# Patient Record
Sex: MALE | Race: WHITE | URBAN - NONMETROPOLITAN AREA
[De-identification: names, ages, dates, MRNs, and addresses within clinical notes are randomized per-mention and may not be internally consistent; named-entity substitution may affect disease eponyms.]

---
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
## 2019-07-05 ENCOUNTER — TELEPHONE (OUTPATIENT)
Dept: FAMILY MEDICINE CLINIC | Facility: CLINIC | Age: 19
End: 2019-07-05

## 2019-11-25 ENCOUNTER — OFFICE VISIT (OUTPATIENT)
Dept: FAMILY MEDICINE CLINIC | Facility: CLINIC | Age: 19
End: 2019-11-25
Payer: COMMERCIAL

## 2019-11-25 VITALS
BODY MASS INDEX: 21.27 KG/M2 | OXYGEN SATURATION: 99 % | TEMPERATURE: 97 F | DIASTOLIC BLOOD PRESSURE: 76 MMHG | HEIGHT: 73.75 IN | HEART RATE: 90 BPM | WEIGHT: 164 LBS | SYSTOLIC BLOOD PRESSURE: 120 MMHG

## 2019-11-25 DIAGNOSIS — J32.9 SINUSITIS, UNSPECIFIED CHRONICITY, UNSPECIFIED LOCATION: ICD-10-CM

## 2019-11-25 DIAGNOSIS — J40 BRONCHITIS: ICD-10-CM

## 2019-11-25 DIAGNOSIS — G43.109 MIGRAINE WITH AURA AND WITHOUT STATUS MIGRAINOSUS, NOT INTRACTABLE: Primary | ICD-10-CM

## 2019-11-25 DIAGNOSIS — L74.510 HYPERHIDROSIS OF AXILLA: ICD-10-CM

## 2019-11-25 PROCEDURE — 99214 OFFICE O/P EST MOD 30 MIN: CPT | Performed by: FAMILY MEDICINE

## 2019-11-25 RX ORDER — AZITHROMYCIN 250 MG/1
TABLET, FILM COATED ORAL
Qty: 6 TABLET | Refills: 0 | Status: SHIPPED | OUTPATIENT
Start: 2019-11-25 | End: 2020-05-15 | Stop reason: ALTCHOICE

## 2019-11-25 RX ORDER — PREDNISONE 20 MG/1
40 TABLET ORAL DAILY
Qty: 10 TABLET | Refills: 0 | Status: SHIPPED | OUTPATIENT
Start: 2019-11-25 | End: 2019-11-30

## 2019-11-25 RX ORDER — SUMATRIPTAN 50 MG/1
50 TABLET, FILM COATED ORAL EVERY 2 HOUR PRN
Qty: 10 TABLET | Refills: 2 | Status: SHIPPED | OUTPATIENT
Start: 2019-11-25 | End: 2021-05-19

## 2019-11-25 NOTE — PROGRESS NOTES
HPI:    Patient ID: Alayna Simon is a 22year old male. X 1 month head saul / cough  W/o fever  OTC meds without relief. Not improving  Feels more in the chest per patient. Complaints also of sweaty armpits and hands. Question treatment.   Migraines Wt 164 lb (74.4 kg)   SpO2 99%   BMI 21.20 kg/m²              ASSESSMENT/PLAN:   Migraine with aura and without status migrainosus, not intractable  (primary encounter diagnosis)  Sinusitis, unspecified chronicity, unspecified location  Bronchitis  Hyperhi

## 2019-11-25 NOTE — PATIENT INSTRUCTIONS
Reassurance regarding hyperhidrosis. Offered prescription Drysol. Patient wishes to hold on.   REST,FLUIDS,ADVIL / TYLENOL PRN fever / body aches  CALL NO CHANGE WORSENING  DISCUSSED WARNING SIGNS  F/U No change 2-3 days  Humidifier, Vicks, Alavert-D.

## 2020-05-15 ENCOUNTER — TELEPHONE (OUTPATIENT)
Dept: FAMILY MEDICINE CLINIC | Facility: CLINIC | Age: 20
End: 2020-05-15

## 2020-05-15 ENCOUNTER — VIRTUAL PHONE E/M (OUTPATIENT)
Dept: FAMILY MEDICINE CLINIC | Facility: CLINIC | Age: 20
End: 2020-05-15
Payer: COMMERCIAL

## 2020-05-15 DIAGNOSIS — J02.9 EXUDATIVE PHARYNGITIS: Primary | ICD-10-CM

## 2020-05-15 PROCEDURE — 99213 OFFICE O/P EST LOW 20 MIN: CPT | Performed by: FAMILY MEDICINE

## 2020-05-15 RX ORDER — AMOXICILLIN 875 MG/1
875 TABLET, COATED ORAL 2 TIMES DAILY
Qty: 20 TABLET | Refills: 0 | Status: SHIPPED | OUTPATIENT
Start: 2020-05-15 | End: 2021-05-19 | Stop reason: ALTCHOICE

## 2020-05-15 NOTE — TELEPHONE ENCOUNTER
Pt presents with a sore throat. Started feeling it last night, woke up this morning white stuff in the back of throat. Feels like he get this once a year.     Virtual/Telephone Check-In    Bam Chavez verbally consents to a [employer] se

## 2020-05-15 NOTE — PROGRESS NOTES
Virtual/Telephone Check-In    Bam Muller  verbally consents to a Virtual/Telephone Check-In service on 5/15/2020   Patient understands and accepts financial responsibility for any deductible, co-insurance and/or co-pays associated with this service. no nausea or abdominal pain  NEURO: denies headaches      EXAM:   VITALS: not available as this is a telemed visit    GENERAL: No specific issues at this time  LUNGS: Patient has no conversational dyspnea and can talk freely without any gasping for air FAAFP

## 2021-05-19 ENCOUNTER — OFFICE VISIT (OUTPATIENT)
Dept: FAMILY MEDICINE CLINIC | Facility: CLINIC | Age: 21
End: 2021-05-19
Payer: COMMERCIAL

## 2021-05-19 VITALS
OXYGEN SATURATION: 98 % | DIASTOLIC BLOOD PRESSURE: 66 MMHG | HEART RATE: 86 BPM | HEIGHT: 73 IN | BODY MASS INDEX: 20.87 KG/M2 | WEIGHT: 157.5 LBS | SYSTOLIC BLOOD PRESSURE: 104 MMHG | TEMPERATURE: 99 F

## 2021-05-19 DIAGNOSIS — F41.1 GENERALIZED ANXIETY DISORDER: ICD-10-CM

## 2021-05-19 DIAGNOSIS — F34.1 DYSTHYMIA: ICD-10-CM

## 2021-05-19 DIAGNOSIS — J30.2 SEASONAL ALLERGIC RHINITIS, UNSPECIFIED TRIGGER: ICD-10-CM

## 2021-05-19 DIAGNOSIS — Z00.00 ROUTINE HEALTH MAINTENANCE: Primary | ICD-10-CM

## 2021-05-19 DIAGNOSIS — G43.109 MIGRAINE WITH AURA AND WITHOUT STATUS MIGRAINOSUS, NOT INTRACTABLE: ICD-10-CM

## 2021-05-19 PROCEDURE — 3008F BODY MASS INDEX DOCD: CPT | Performed by: FAMILY MEDICINE

## 2021-05-19 PROCEDURE — 3074F SYST BP LT 130 MM HG: CPT | Performed by: FAMILY MEDICINE

## 2021-05-19 PROCEDURE — 99395 PREV VISIT EST AGE 18-39: CPT | Performed by: FAMILY MEDICINE

## 2021-05-19 PROCEDURE — 3078F DIAST BP <80 MM HG: CPT | Performed by: FAMILY MEDICINE

## 2021-05-19 RX ORDER — SUMATRIPTAN 50 MG/1
50 TABLET, FILM COATED ORAL EVERY 2 HOUR PRN
Qty: 10 TABLET | Refills: 2 | Status: SHIPPED | OUTPATIENT
Start: 2021-05-19

## 2021-05-19 RX ORDER — FLUTICASONE PROPIONATE 50 MCG
2 SPRAY, SUSPENSION (ML) NASAL DAILY
Qty: 3 BOTTLE | Refills: 3 | Status: SHIPPED | OUTPATIENT
Start: 2021-05-19 | End: 2022-05-14

## 2021-05-19 NOTE — PROGRESS NOTES
Bam Chavez is a 23year old male. Patient presents with:  CPX: annual physical--and a few other things. .... [facility] room 8      HPI:   Physical.  Patient with complaints of allergy. Antihistamine with some relief. Wondering about options.   Patient's Governor Shelly distress  SKIN: no rashes,no suspicious lesions  HEENT: atraumatic, normocephalic, R TM normal, L TM normal, Pharynx normal  NECK: supple, no cervical adenopathy  LUNGS: clear to auscultation  CARDIO: RRR without murmur  ABD soft, nontender, normal BS, no

## 2021-08-09 NOTE — TELEPHONE ENCOUNTER
Pt calls stating he is supposed to give an update re: his new medication. Pt states he like and feels better since taking this medication. Pt would like a refill.     Last refill: 5/19/21 390 w/ 0 refills    Last OV: 5/19/21  Last labs: None    No future ap

## 2021-11-05 NOTE — TELEPHONE ENCOUNTER
Requested Prescriptions     Pending Prescriptions Disp Refills   • SERTRALINE 50 MG Oral Tab [Pharmacy Med Name: SERTRALINE 50MG TABLETS] 90 tablet 0     Sig: TAKE 1 TABLET(50 MG) BY MOUTH DAILY     Last refill #90 on 8/9/2021  Last office visit pertaining

## 2022-02-26 NOTE — TELEPHONE ENCOUNTER
LOV: 05/19/21    LAST LAB: n/a    LAST RX: 11/5/21    Next OV: No future appointments.     PROTOCOL: n/a

## 2022-02-28 NOTE — TELEPHONE ENCOUNTER
Left Message - due for f/u with doctor for med check      By Emely Scanlon RN        Left Message - due for f/u with doctor for med check      By Emely Scanlon RN

## 2022-04-18 ENCOUNTER — TELEPHONE (OUTPATIENT)
Dept: FAMILY MEDICINE CLINIC | Facility: CLINIC | Age: 22
End: 2022-04-18

## 2022-04-18 NOTE — TELEPHONE ENCOUNTER
PLEASE CALL PT MOTHER- PT IS VERY DEPRESSED AND NEEDS HELP.  SHE WOULD LIKE TO DISCUSS OPTIONS WITH YOU-    Tiffanie Montejo

## 2022-04-18 NOTE — TELEPHONE ENCOUNTER
Mother calls- fawad is very depressed; asks what should she do to get him help. Denies thoughts of hurting himself and others; but comments \"it's too late now. \"  Directed to take him to [facility] for intake assessment.   ej/cj

## 2022-05-14 RX ORDER — FOLIC ACID 1 MG/1
1 TABLET ORAL DAILY
Qty: 90 TABLET | Refills: 0 | Status: SHIPPED | OUTPATIENT
Start: 2022-05-14

## 2022-05-14 RX ORDER — NALTREXONE HYDROCHLORIDE 50 MG/1
50 TABLET, FILM COATED ORAL DAILY
Qty: 90 TABLET | Refills: 0 | Status: SHIPPED | OUTPATIENT
Start: 2022-05-14

## 2022-05-14 RX ORDER — THIAMINE MONONITRATE (VIT B1) 100 MG
100 TABLET ORAL DAILY
Qty: 90 TABLET | Refills: 0 | Status: SHIPPED | OUTPATIENT
Start: 2022-05-14

## 2022-05-14 NOTE — TELEPHONE ENCOUNTER
folic acid 1 MG Oral Tab, naltrexone 50 MG Oral Tab, Vitamin B1, sertraline 50 MG Oral Tab  - [facility]

## 2022-09-12 RX ORDER — THIAMINE MONONITRATE (VIT B1) 100 MG
100 TABLET ORAL DAILY
Qty: 90 TABLET | Refills: 0 | Status: SHIPPED | OUTPATIENT
Start: 2022-09-12

## 2022-09-12 RX ORDER — NALTREXONE HYDROCHLORIDE 50 MG/1
50 TABLET, FILM COATED ORAL DAILY
Qty: 90 TABLET | Refills: 0 | Status: SHIPPED | OUTPATIENT
Start: 2022-09-12

## 2022-09-12 RX ORDER — FOLIC ACID 1 MG/1
1 TABLET ORAL DAILY
Qty: 90 TABLET | Refills: 0 | Status: SHIPPED | OUTPATIENT
Start: 2022-09-12

## 2022-12-21 RX ORDER — FOLIC ACID 1 MG/1
TABLET ORAL
Qty: 90 TABLET | Refills: 0 | OUTPATIENT
Start: 2022-12-21

## 2022-12-22 NOTE — TELEPHONE ENCOUNTER
Future Appointments   Date Time Provider Javad Romo   12/27/2022 11:00 AM Joana Elizalde DO [facility]

## 2023-02-26 DIAGNOSIS — F41.1 GENERALIZED ANXIETY DISORDER: ICD-10-CM

## 2023-02-26 DIAGNOSIS — F34.1 DYSTHYMIA: ICD-10-CM

## 2023-04-06 ENCOUNTER — TELEPHONE (OUTPATIENT)
Dept: FAMILY MEDICINE CLINIC | Facility: CLINIC | Age: 23
End: 2023-04-06

## 2023-04-06 DIAGNOSIS — F41.1 GENERALIZED ANXIETY DISORDER: ICD-10-CM

## 2023-04-06 DIAGNOSIS — F34.1 DYSTHYMIA: ICD-10-CM

## 2023-04-06 DIAGNOSIS — F10.11 HISTORY OF ETOH ABUSE: ICD-10-CM

## 2023-04-06 RX ORDER — FOLIC ACID 1 MG/1
1 TABLET ORAL DAILY
Qty: 90 TABLET | Refills: 1 | Status: SHIPPED | OUTPATIENT
Start: 2023-04-06

## 2023-05-30 DIAGNOSIS — L25.9 CONTACT DERMATITIS: ICD-10-CM

## 2023-05-30 RX ORDER — CLOBETASOL PROPIONATE 0.5 MG/G
CREAM TOPICAL
Qty: 60 G | Refills: 0 | Status: SHIPPED | OUTPATIENT
Start: 2023-05-30

## 2023-05-30 NOTE — TELEPHONE ENCOUNTER
Just started breaking out acouple days ago with poison ivy on both forearms. Using calamine and will take benadryl tonight. Will doctor order medication for him.     Clobetasol ordered august 2013

## 2023-12-16 DIAGNOSIS — F10.11 HISTORY OF ETOH ABUSE: ICD-10-CM

## 2023-12-16 DIAGNOSIS — F34.1 DYSTHYMIA: ICD-10-CM

## 2023-12-16 DIAGNOSIS — F41.1 GENERALIZED ANXIETY DISORDER: ICD-10-CM

## 2023-12-16 NOTE — TELEPHONE ENCOUNTER
PT REQUESTING REFILLS ON sertraline 50 MG Oral Tab AND folic acid 1 MG Oral Tab.     [facility]

## 2023-12-18 RX ORDER — FOLIC ACID 1 MG/1
1 TABLET ORAL DAILY
Qty: 90 TABLET | Refills: 0 | Status: SHIPPED | OUTPATIENT
Start: 2023-12-18

## 2024-01-26 ENCOUNTER — TELEPHONE (OUTPATIENT)
Dept: FAMILY MEDICINE CLINIC | Facility: CLINIC | Age: 24
End: 2024-01-26

## 2024-01-30 ENCOUNTER — TELEPHONE (OUTPATIENT)
Dept: FAMILY MEDICINE CLINIC | Facility: CLINIC | Age: 24
End: 2024-01-30

## 2024-01-30 DIAGNOSIS — F34.1 DYSTHYMIA: ICD-10-CM

## 2024-01-30 DIAGNOSIS — F41.1 GENERALIZED ANXIETY DISORDER: ICD-10-CM

## 2024-01-30 NOTE — TELEPHONE ENCOUNTER
Patient is presently working in [place].  The company he works for is out of [place].  He moves frequently.  Next time will be back home is over July 4th.  Instructed doctor wants to see him q 6 mths, and has been over 1 year since seen.  Situation discussed with Dr Spangler.  Advised- doctor will keep him as a patient, but must be seen when home over July 4th.  Will only refill the Sertraline.  No folic acid until bloodwork done,    Future Appointments   Date Time Provider Department Center   7/3/2024  9:15 AM King Spangler, DO [facility] [place]   7/3/2024  9:30 AM REF [facility] REF [facility] Ref [facility] [place]

## 2024-01-30 NOTE — TELEPHONE ENCOUNTER
PT NEEDS SERTRALINE AND FOLIC ACID SENT TO [id number] - [place], [place] - [address] [facility] [address]    CHART HAS BEEN UPDATED.

## 2024-03-11 ENCOUNTER — TELEPHONE (OUTPATIENT)
Dept: FAMILY MEDICINE CLINIC | Facility: CLINIC | Age: 24
End: 2024-03-11

## 2024-03-11 DIAGNOSIS — F41.1 GENERALIZED ANXIETY DISORDER: ICD-10-CM

## 2024-03-11 DIAGNOSIS — F34.1 DYSTHYMIA: ICD-10-CM

## 2024-03-11 NOTE — TELEPHONE ENCOUNTER
Patient is presently on Sertraline 50 mg daily; and feels like it isn't helping.  He is feeling more down in the dumps; depressed.  Can dose be adjusted?  He will see you in July over the 4th

## 2024-04-17 NOTE — TELEPHONE ENCOUNTER
Dose is 0.05%  It was a 60 gm tube. Mom says it was 2/3rds empty. She is calling poison control. Mental Health Treatment/Substance Use Disorder Treatment

## 2024-04-30 ENCOUNTER — OFFICE VISIT (OUTPATIENT)
Dept: FAMILY MEDICINE CLINIC | Facility: CLINIC | Age: 24
End: 2024-04-30
Payer: COMMERCIAL

## 2024-04-30 ENCOUNTER — TELEPHONE (OUTPATIENT)
Dept: FAMILY MEDICINE CLINIC | Facility: CLINIC | Age: 24
End: 2024-04-30

## 2024-04-30 VITALS
BODY MASS INDEX: 24 KG/M2 | OXYGEN SATURATION: 96 % | WEIGHT: 188 LBS | HEART RATE: 120 BPM | TEMPERATURE: 99 F | SYSTOLIC BLOOD PRESSURE: 118 MMHG | DIASTOLIC BLOOD PRESSURE: 70 MMHG

## 2024-04-30 DIAGNOSIS — F10.11 HISTORY OF ETOH ABUSE: ICD-10-CM

## 2024-04-30 DIAGNOSIS — F41.1 GENERALIZED ANXIETY DISORDER: ICD-10-CM

## 2024-04-30 DIAGNOSIS — F34.1 DYSTHYMIA: ICD-10-CM

## 2024-04-30 DIAGNOSIS — L72.3 SEBACEOUS CYST OF AXILLA: Primary | ICD-10-CM

## 2024-04-30 PROCEDURE — 3078F DIAST BP <80 MM HG: CPT | Performed by: FAMILY MEDICINE

## 2024-04-30 PROCEDURE — 96127 BRIEF EMOTIONAL/BEHAV ASSMT: CPT | Performed by: FAMILY MEDICINE

## 2024-04-30 PROCEDURE — 99214 OFFICE O/P EST MOD 30 MIN: CPT | Performed by: FAMILY MEDICINE

## 2024-04-30 PROCEDURE — 3074F SYST BP LT 130 MM HG: CPT | Performed by: FAMILY MEDICINE

## 2024-04-30 RX ORDER — TRAZODONE HYDROCHLORIDE 50 MG/1
TABLET ORAL
COMMUNITY
Start: 2024-04-02

## 2024-04-30 RX ORDER — ESCITALOPRAM OXALATE 10 MG/1
TABLET ORAL
COMMUNITY
Start: 2024-04-07

## 2024-04-30 RX ORDER — GABAPENTIN 600 MG/1
TABLET ORAL
COMMUNITY
Start: 2024-04-18

## 2024-04-30 RX ORDER — HYDROXYZINE PAMOATE 25 MG/1
CAPSULE ORAL
COMMUNITY
Start: 2024-04-02

## 2024-04-30 RX ORDER — CLONIDINE HYDROCHLORIDE 0.1 MG/1
TABLET ORAL
COMMUNITY
Start: 2024-04-02

## 2024-04-30 RX ORDER — ARIPIPRAZOLE 20 MG/1
TABLET ORAL
COMMUNITY
Start: 2024-04-25

## 2024-04-30 RX ORDER — CEPHALEXIN 500 MG/1
500 CAPSULE ORAL 3 TIMES DAILY
Qty: 30 CAPSULE | Refills: 0 | Status: SHIPPED | OUTPATIENT
Start: 2024-04-30

## 2024-04-30 RX ORDER — ATOMOXETINE 40 MG/1
CAPSULE ORAL
COMMUNITY
Start: 2024-04-25

## 2024-04-30 NOTE — PROGRESS NOTES
Subjective:   Patient ID: Bam Stoll is a 23 year old male.  Patient status post inpatient alcohol withdrawal.  Status post seizure.  Seeing counselor and psychiatry.  Taking medications as directed.  Just discharged from inpatient treatment.  Axillary infected sebaceous cyst.  Comes and goes.  Has had opened multiple times.  No treatment at this time.  HPI    History/Other:   Review of Systems  Current Outpatient Medications   Medication Sig Dispense Refill    ARIPiprazole 20 MG Oral Tab       atomoxetine 40 MG Oral Cap       gabapentin 600 MG Oral Tab       hydrOXYzine Pamoate 25 MG Oral Cap       traZODone 50 MG Oral Tab       cephalexin 500 MG Oral Cap Take 1 capsule (500 mg total) by mouth 3 (three) times daily. 30 capsule 0    sertraline 50 MG Oral Tab Take 1.5 tablets (75 mg total) by mouth daily. 135 tablet 0    folic acid 1 MG Oral Tab Take 1 tablet (1 mg total) by mouth daily. 90 tablet 0    clobetasol 0.05 % External Cream Apply bid x 10 days 60 g 0    Vitamin B-1 100 MG Oral Tab Take 1 tablet (100 mg total) by mouth daily. 90 tablet 1    naltrexone 50 MG Oral Tab Take 1 tablet (50 mg total) by mouth daily. 90 tablet 0    SUMAtriptan Succinate (IMITREX) 50 MG Oral Tab Take 1 tablet (50 mg total) by mouth every 2 (two) hours as needed for Migraine. Use at onset; repeat once after 2 HRS-ONLY 2 IN 24 HR MAX (Patient not taking: Reported on 5/5/2022) 10 tablet 2     Allergies:No Known Allergies    Objective:   Physical Exam  Vitals reviewed.   Constitutional:       Appearance: Normal appearance.   Cardiovascular:      Rate and Rhythm: Normal rate and regular rhythm.      Pulses: Normal pulses.      Heart sounds: Normal heart sounds.   Pulmonary:      Effort: Pulmonary effort is normal.      Breath sounds: Normal breath sounds.   Musculoskeletal:      Cervical back: Neck supple.   Skin:     General: Skin is warm and dry.      Comments: Erythema, induration infected sebaceous cyst right axilla.  Q-tip with  hydrogen peroxide.  Capsule removed.  Dressing applied.  Patient to use Q-tip with peroxide daily to keep open.     Neurological:      Mental Status: He is alert.       /70   Pulse 120   Temp 99 °F (37.2 °C) (Temporal)   Wt 188 lb (85.3 kg)   SpO2 96%   BMI 24.30 kg/m²     Assessment & Plan:   1. History of ETOH abuse    2. Sebaceous cyst of axilla,infected R    3. Generalized anxiety disorder    4. Dysthymia        No orders of the defined types were placed in this encounter.      Meds This Visit:  Requested Prescriptions     Signed Prescriptions Disp Refills    cephalexin 500 MG Oral Cap 30 capsule 0     Sig: Take 1 capsule (500 mg total) by mouth 3 (three) times daily.       Imaging & Referrals:  None

## 2024-04-30 NOTE — PATIENT INSTRUCTIONS
Continue follow-up with psychiatry and counseling.  Medications as directed by them.  Moist heat to right axilla.  Q-tip with peroxide daily.  Call with questions or problems.  Follow-up in 2 to 4 weeks.  Call with questions or problems.

## 2024-04-30 NOTE — TELEPHONE ENCOUNTER
Just seen- asking for above scripts; clonidine not even listed, no dose on trazodone.  Routed to Dr Spangler; are you filling these?

## 2024-04-30 NOTE — TELEPHONE ENCOUNTER
Clonidine, trazodone, aripiprazole, folic acid     his insurance will only pay for a 90 day supply of each    [facility] [place]

## 2024-07-03 ENCOUNTER — LABORATORY ENCOUNTER (OUTPATIENT)
Dept: LAB | Age: 24
End: 2024-07-03
Attending: FAMILY MEDICINE
Payer: COMMERCIAL

## 2024-07-03 DIAGNOSIS — F34.1 DYSTHYMIA: ICD-10-CM

## 2024-07-03 DIAGNOSIS — F41.1 GENERALIZED ANXIETY DISORDER: ICD-10-CM

## 2024-07-03 DIAGNOSIS — F10.11 HISTORY OF ETOH ABUSE: ICD-10-CM

## 2024-07-03 LAB
ALBUMIN SERPL-MCNC: 4.1 G/DL (ref 3.4–5)
ALBUMIN/GLOB SERPL: 1.4 {RATIO} (ref 1–2)
ALP LIVER SERPL-CCNC: 54 U/L
ALT SERPL-CCNC: 20 U/L
ANION GAP SERPL CALC-SCNC: 6 MMOL/L (ref 0–18)
AST SERPL-CCNC: 11 U/L (ref 15–37)
BASOPHILS # BLD AUTO: 0.08 X10(3) UL (ref 0–0.2)
BASOPHILS NFR BLD AUTO: 1.4 %
BILIRUB SERPL-MCNC: 0.4 MG/DL (ref 0.1–2)
BUN BLD-MCNC: 7 MG/DL (ref 9–23)
CALCIUM BLD-MCNC: 9 MG/DL (ref 8.5–10.1)
CHLORIDE SERPL-SCNC: 107 MMOL/L (ref 98–112)
CHOLEST SERPL-MCNC: 121 MG/DL (ref ?–200)
CO2 SERPL-SCNC: 28 MMOL/L (ref 21–32)
CREAT BLD-MCNC: 1.07 MG/DL
EGFRCR SERPLBLD CKD-EPI 2021: 99 ML/MIN/1.73M2 (ref 60–?)
EOSINOPHIL # BLD AUTO: 0.25 X10(3) UL (ref 0–0.7)
EOSINOPHIL NFR BLD AUTO: 4.2 %
ERYTHROCYTE [DISTWIDTH] IN BLOOD BY AUTOMATED COUNT: 11.7 %
FASTING PATIENT LIPID ANSWER: YES
FASTING STATUS PATIENT QL REPORTED: YES
GLOBULIN PLAS-MCNC: 3 G/DL (ref 2.8–4.4)
GLUCOSE BLD-MCNC: 93 MG/DL (ref 70–99)
HCT VFR BLD AUTO: 46.1 %
HDLC SERPL-MCNC: 41 MG/DL (ref 40–59)
HGB BLD-MCNC: 15.7 G/DL
IMM GRANULOCYTES # BLD AUTO: 0.01 X10(3) UL (ref 0–1)
IMM GRANULOCYTES NFR BLD: 0.2 %
LDLC SERPL CALC-MCNC: 67 MG/DL (ref ?–100)
LYMPHOCYTES # BLD AUTO: 1.76 X10(3) UL (ref 1–4)
LYMPHOCYTES NFR BLD AUTO: 29.9 %
MCH RBC QN AUTO: 30.8 PG (ref 26–34)
MCHC RBC AUTO-ENTMCNC: 34.1 G/DL (ref 31–37)
MCV RBC AUTO: 90.4 FL
MONOCYTES # BLD AUTO: 0.5 X10(3) UL (ref 0.1–1)
MONOCYTES NFR BLD AUTO: 8.5 %
NEUTROPHILS # BLD AUTO: 3.29 X10 (3) UL (ref 1.5–7.7)
NEUTROPHILS # BLD AUTO: 3.29 X10(3) UL (ref 1.5–7.7)
NEUTROPHILS NFR BLD AUTO: 55.8 %
NONHDLC SERPL-MCNC: 80 MG/DL (ref ?–130)
OSMOLALITY SERPL CALC.SUM OF ELEC: 290 MOSM/KG (ref 275–295)
PLATELET # BLD AUTO: 204 10(3)UL (ref 150–450)
POTASSIUM SERPL-SCNC: 4.5 MMOL/L (ref 3.5–5.1)
PROT SERPL-MCNC: 7.1 G/DL (ref 6.4–8.2)
RBC # BLD AUTO: 5.1 X10(6)UL
SODIUM SERPL-SCNC: 141 MMOL/L (ref 136–145)
TRIGL SERPL-MCNC: 61 MG/DL (ref 30–149)
TSI SER-ACNC: 1.14 MIU/ML (ref 0.36–3.74)
VLDLC SERPL CALC-MCNC: 9 MG/DL (ref 0–30)
WBC # BLD AUTO: 5.9 X10(3) UL (ref 4–11)

## 2024-07-03 PROCEDURE — 80050 GENERAL HEALTH PANEL: CPT | Performed by: FAMILY MEDICINE

## 2024-07-03 PROCEDURE — 80061 LIPID PANEL: CPT | Performed by: FAMILY MEDICINE

## 2024-07-23 ENCOUNTER — OFFICE VISIT (OUTPATIENT)
Dept: FAMILY MEDICINE CLINIC | Facility: CLINIC | Age: 24
End: 2024-07-23
Payer: COMMERCIAL

## 2024-07-23 VITALS
HEART RATE: 68 BPM | BODY MASS INDEX: 22 KG/M2 | TEMPERATURE: 98 F | DIASTOLIC BLOOD PRESSURE: 60 MMHG | WEIGHT: 171 LBS | SYSTOLIC BLOOD PRESSURE: 108 MMHG

## 2024-07-23 DIAGNOSIS — L03.012 PARONYCHIA OF LEFT INDEX FINGER: Primary | ICD-10-CM

## 2024-07-23 PROCEDURE — 99213 OFFICE O/P EST LOW 20 MIN: CPT | Performed by: FAMILY MEDICINE

## 2024-07-23 PROCEDURE — 3078F DIAST BP <80 MM HG: CPT | Performed by: FAMILY MEDICINE

## 2024-07-23 PROCEDURE — 10060 I&D ABSCESS SIMPLE/SINGLE: CPT | Performed by: FAMILY MEDICINE

## 2024-07-23 PROCEDURE — 3074F SYST BP LT 130 MM HG: CPT | Performed by: FAMILY MEDICINE

## 2024-07-23 RX ORDER — CEPHALEXIN 500 MG/1
500 CAPSULE ORAL 3 TIMES DAILY
Qty: 30 CAPSULE | Refills: 0 | Status: SHIPPED | OUTPATIENT
Start: 2024-07-23

## 2024-07-23 NOTE — PROGRESS NOTES
Subjective:   Patient ID: Bam Stoll is a 23 year old male.    Finger Pain     X 1 week discomfort to left second digit.  Increased swelling over the last 3 days.  Without drainage.  Patient states has been soaking the finger.  Without other problems or concerns.  Denies fever, chills.    History/Other:   Review of Systems  Current Outpatient Medications   Medication Sig Dispense Refill    cephalexin 500 MG Oral Cap Take 1 capsule (500 mg total) by mouth 3 (three) times daily. 30 capsule 0    ARIPiprazole 20 MG Oral Tab       atomoxetine 40 MG Oral Cap       gabapentin 600 MG Oral Tab       hydrOXYzine Pamoate 25 MG Oral Cap       traZODone 50 MG Oral Tab       cephalexin 500 MG Oral Cap Take 1 capsule (500 mg total) by mouth 3 (three) times daily. 30 capsule 0    cloNIDine 0.1 MG Oral Tab       escitalopram 10 MG Oral Tab       sertraline 50 MG Oral Tab Take 1.5 tablets (75 mg total) by mouth daily. 135 tablet 0    folic acid 1 MG Oral Tab Take 1 tablet (1 mg total) by mouth daily. 90 tablet 0    clobetasol 0.05 % External Cream Apply bid x 10 days 60 g 0    Vitamin B-1 100 MG Oral Tab Take 1 tablet (100 mg total) by mouth daily. 90 tablet 1    naltrexone 50 MG Oral Tab Take 1 tablet (50 mg total) by mouth daily. 90 tablet 0    SUMAtriptan Succinate (IMITREX) 50 MG Oral Tab Take 1 tablet (50 mg total) by mouth every 2 (two) hours as needed for Migraine. Use at onset; repeat once after 2 HRS-ONLY 2 IN 24 HR MAX (Patient not taking: Reported on 5/5/2022) 10 tablet 2     Allergies:No Known Allergies    Objective:   Physical Exam  Vitals reviewed.   Constitutional:       Appearance: Normal appearance.   Skin:     Comments: Abscess noted base of left second nail, paronychial area.  Erythema, swelling.  Alcohol wipe with I&D of abscess.  Pus expressed.  Decreased swelling.  Dressing applied.  Soak finger 3 times daily x 5 to 10 minutes in warm soapy water.  Antibiotic.  Call with questions or problems.    Neurological:      Mental Status: He is alert.         Assessment & Plan:   1. Paronychia of left index finger        No orders of the defined types were placed in this encounter.      Meds This Visit:  Requested Prescriptions     Signed Prescriptions Disp Refills    cephalexin 500 MG Oral Cap 30 capsule 0     Sig: Take 1 capsule (500 mg total) by mouth 3 (three) times daily.       Imaging & Referrals:  None

## 2025-04-28 ENCOUNTER — TELEPHONE (OUTPATIENT)
Dept: FAMILY MEDICINE CLINIC | Facility: CLINIC | Age: 25
End: 2025-04-28

## (undated) NOTE — LETTER
Name:  Uli Manuel Year:  11th Grade Class: Student ID No.:   Address:  [address] [address] Phone:  [phone] (home)  : [id number] 15year old   Name Relationship [address] Work Phone Home Phone Mobile Phone   1.  BROOKE COWAN syndrome, short QT syndrome, Brugada syndrome, or catecholaminergic polymorphic ventricular tachycardia? No   15. Does anyone in your family have a heart problem, pacemaker, or implanted defibrillator? No   16.  Has anyone in your family had unexplained nadya 39. Do you have a history of seizure disorder? No   37. Do you have headaches with exercise? No   38. Have you ever had numbness, tingling, or weakness in your arms or legs after being hit or falling? No   39. Have you ever been unable to move your arms / l Vision: R            L            BOTH                MEDICAL NORMAL ABNORMAL FINDINGS   Appearance:  Marfan stigmata (kyphoscoliosis, high-arched palate, pectus excavatum,      arachnodactyly, arm span > height, hyperlaxity, myopia, MVP, aortic insufficie As a prerequisite to participation in [employer] athletic activities, we agree that I/our student will not use performance-enhancing substances as defined in the [facility] Performance-Enhancing Substance Testing Program Protocol.  We have reviewed the policy and under

## (undated) NOTE — LETTER
05/05/22      Bam Stoll  [address] [facility] [address] [address]          To whom it may concern,      Patient has history of alcohol abuse/Depression/Anxiety. Bam has recently completed inpatient treatment program continues with out-patient treatment for these medical conditions. Please excuse Spring semester.        Dr. Sumi Alva, DO